# Patient Record
Sex: MALE | Race: WHITE | URBAN - METROPOLITAN AREA
[De-identification: names, ages, dates, MRNs, and addresses within clinical notes are randomized per-mention and may not be internally consistent; named-entity substitution may affect disease eponyms.]

---

## 2021-02-08 ENCOUNTER — TRANSCRIBE ORDER (OUTPATIENT)
Dept: SCHEDULING | Age: 21
End: 2021-02-08

## 2021-02-08 DIAGNOSIS — J32.4 CHRONIC PANSINUSITIS: ICD-10-CM

## 2021-02-08 DIAGNOSIS — J34.2 DEVIATED NASAL SEPTUM: Primary | ICD-10-CM

## 2022-12-22 ENCOUNTER — OFFICE VISIT (OUTPATIENT)
Dept: ORTHOPEDIC SURGERY | Age: 22
End: 2022-12-22
Payer: COMMERCIAL

## 2022-12-22 VITALS — HEIGHT: 68 IN | BODY MASS INDEX: 31.07 KG/M2 | WEIGHT: 205 LBS

## 2022-12-22 DIAGNOSIS — M54.12 CERVICAL RADICULOPATHY: ICD-10-CM

## 2022-12-22 DIAGNOSIS — M25.511 RIGHT SHOULDER PAIN, UNSPECIFIED CHRONICITY: Primary | ICD-10-CM

## 2022-12-22 DIAGNOSIS — S43.431A SUPERIOR GLENOID LABRUM LESION OF RIGHT SHOULDER, INITIAL ENCOUNTER: ICD-10-CM

## 2022-12-22 RX ORDER — DICLOFENAC SODIUM 75 MG/1
75 TABLET, DELAYED RELEASE ORAL 2 TIMES DAILY
Qty: 60 TABLET | Refills: 3 | Status: SHIPPED | OUTPATIENT
Start: 2022-12-22

## 2022-12-22 RX ORDER — ALBUTEROL SULFATE 90 UG/1
AEROSOL, METERED RESPIRATORY (INHALATION)
COMMUNITY
Start: 2022-12-16

## 2022-12-22 NOTE — PROGRESS NOTES
Alonzo Hutchinson (: 2000) is a 25 y.o. male, new patient, here for evaluation of the following chief complaint(s):  Shoulder Pain (Right )       ASSESSMENT/PLAN:  Below is the assessment and plan developed based on review of pertinent history, physical exam, labs, studies, and medications. Findings were discussed with the patient today. We discussed regimen of ice, anti-inflammatories, physical therapy, home exercise program, and activity modifications. If there is continued pain and symptoms then we will plan for follow-up in the next 4-6 weeks for further evaluation and treatment planning. 1. Right shoulder pain, unspecified chronicity  -     XR SHOULDER RT AP/LAT MIN 2 V; Future  2. Cervical radiculopathy  -     REFERRAL TO PHYSICAL THERAPY  -     diclofenac EC (VOLTAREN) 75 mg EC tablet; Take 1 Tablet by mouth two (2) times a day., Normal, Disp-60 Tablet, R-3  3. Superior glenoid labrum lesion of right shoulder, initial encounter  -     REFERRAL TO PHYSICAL THERAPY  -     diclofenac EC (VOLTAREN) 75 mg EC tablet; Take 1 Tablet by mouth two (2) times a day., Normal, Disp-60 Tablet, R-3      Return in about 6 weeks (around 2023), or if symptoms worsen or fail to improve. SUBJECTIVE/OBJECTIVE:  Alonzo Hutchinson (: 2000) is a 25 y.o. male. He notes sharp aching pain in the right shoulder localizing to the medial border of the scapula and has been ongoing for about 6 months. Worsening for the last 2 months. He denies any specific injury but does enjoy playing baseball and has up until earlier this year been throwing bullpen exercises. He believes that this may have contributed to his pain. Ice and activity modifications provide minimal relief. Not on File    Current Outpatient Medications   Medication Sig    diclofenac EC (VOLTAREN) 75 mg EC tablet Take 1 Tablet by mouth two (2) times a day.     albuterol (PROVENTIL HFA, VENTOLIN HFA, PROAIR HFA) 90 mcg/actuation inhaler      No current facility-administered medications for this visit. Social History     Socioeconomic History    Marital status: SINGLE     Spouse name: Not on file    Number of children: Not on file    Years of education: Not on file    Highest education level: Not on file   Occupational History    Not on file   Tobacco Use    Smoking status: Not on file    Smokeless tobacco: Not on file   Substance and Sexual Activity    Alcohol use: Not on file    Drug use: Not on file    Sexual activity: Not on file   Other Topics Concern    Not on file   Social History Narrative    Not on file     Social Determinants of Health     Financial Resource Strain: Not on file   Food Insecurity: Not on file   Transportation Needs: Not on file   Physical Activity: Not on file   Stress: Not on file   Social Connections: Not on file   Intimate Partner Violence: Not on file   Housing Stability: Not on file       History reviewed. No pertinent surgical history. History reviewed. No pertinent family history. REVIEW OF SYSTEMS:    Patient denies any recent fever, chills, nausea, vomiting, chest pain, or shortness of breath. Vitals:  Ht 5' 8\" (1.727 m)   Wt 205 lb (93 kg)   BMI 31.17 kg/m²    Body mass index is 31.17 kg/m². PHYSICAL EXAM:  General exam: Patient is awake, alert, and oriented x3. Well-appearing. No acute distress. Ambulates with a normal gait. Heart/Lungs:  no respiratory distress, palpable pulses    Right shoulder: Normal range of motion is noted. He does have excellent rotator cuff strength on testing. There is pain localizing to the medial border the scapula near the rhomboids when he provides any resistance to strength testing. Normal stability. There is pain with [place]'s testing. Negative speeds exam.    IMAGING:    XR Results (most recent):  Results from Hospital Encounter encounter on 05/14/10    XR ABDOMEN ACUTE COMP W 1 VW CHEST    Narrative         ICD Codes / Adm. Diagnosis:    / STOMACH PAINS  Examination:  ABDOMEN ACUTE W PA CHEST  - [id number] - May 14 2010  2:59AM  Accession No:  [id number]  Reason:  REASON: ABDOMINAL PAIN      REPORT:  INDICATION: Pain. COMPARISON: None. FINDINGS: The upright chest radiograph demonstrates clear lungs and normal  cardiac and mediastinal contours. There is no pleural effusion or free air  under the diaphragm. Supine and upright  views of the abdomen demonstrate a nonobstructive bowel  gas pattern. There is no free intraperitoneal air. No soft tissue masses or  pathologic calcifications are identified. The bones are within normal  limits. There is moderate stool in the colon. IMPRESSION: No acute findings. Interpreting/Reading Doctor: Annetta Guajardo ([id number])  Transcribed: n/a on 05/14/2010  Approved: Annetta Guajardo ([id number])  05/14/2010        Distribution:  Attending Doctor: Hira Abraham  Alternate Doctor: Hira Abraham         Orders Placed This Encounter    XR SHOULDER RT AP/LAT MIN 2 V     2b     Standing Status:   Future     Number of Occurrences:   1     Standing Expiration Date:   12/23/2023    REFERRAL TO PHYSICAL THERAPY     Referral Priority:   Routine     Referral Type:   PT/OT/ST     Referral Reason:   Specialty Services Required     Number of Visits Requested:   1    diclofenac EC (VOLTAREN) 75 mg EC tablet     Sig: Take 1 Tablet by mouth two (2) times a day. Dispense:  60 Tablet     Refill:  3              An electronic signature was used to authenticate this note.   -- Yakelin Palmer DO

## 2022-12-22 NOTE — LETTER
12/22/2022    Patient: Ganesh Queen   YOB: 2000   Date of Visit: 12/22/2022     Acosta Santos MD  [address] [facility]  [address] [place] [address]  Via Fax: [phone]    Dear Acosta Santos MD,      Thank you for referring Mr. Ganesh Queen to [facility] for evaluation. My notes for this consultation are attached. If you have questions, please do not hesitate to call me. I look forward to following your patient along with you.       Sincerely,    Rubi Pollard, DO

## 2023-04-30 RX ORDER — DICLOFENAC SODIUM 75 MG/1
TABLET, DELAYED RELEASE ORAL 2 TIMES DAILY
COMMUNITY
Start: 2022-12-22

## 2023-04-30 RX ORDER — ALBUTEROL SULFATE 90 UG/1
AEROSOL, METERED RESPIRATORY (INHALATION)
COMMUNITY
Start: 2022-12-16